# Patient Record
(demographics unavailable — no encounter records)

---

## 2024-10-22 NOTE — HISTORY OF PRESENT ILLNESS
[FreeTextEntry1] : KYRIE [de-identified] : colonoscopy due in 3 years per patient.  Chol went up from prior he states that he needs med refills today

## 2024-10-22 NOTE — HISTORY OF PRESENT ILLNESS
[FreeTextEntry1] : KYRIE [de-identified] : colonoscopy due in 3 years per patient.  Chol went up from prior he states that he needs med refills today

## 2024-10-22 NOTE — ASSESSMENT
[FreeTextEntry1] : , , Preventative: Counseled on health promotion and disease prevention. EKG and wellness labs done colonoscopy due in 3 years Tdap april 205 zoster june 2023 encouraged Flu / Prevnar Heart Screen:  EKG nsr  HTN: Irbesartan - HCTZ Rx -Advised low salt diet, regular exercise, weight loss. -Counseled on importance of medication compliance. -Advised to keep BP log at home- will review at next visit. -Follow up in office for BP check.    HLD: LDL 85--->166 worsened  discussed role for statin if levels are not improving with diet and exercise.  Counseled on diet, exercise and lifestyle modifications. Advised a diet centered around whole plant based foods.  Vegetables, fruits, legumes, grains, nuts.  Advised limiting intake of refined processed foods (refined white flour products, refined sugar, soda, juice).  Limit intake of meat, dairy, eggs, oil. reviewed prior Lipi panel results today    Anxiety: Counseling / therapy increase exercise Alprazolam prn  Insomnia:   Zolpidem rx Sleep hygiene measures    GERD: Counseled on dietary modifications. Avoid tomato products, mint, citrus (drinks / fruit), fried fatty foods, caffeine, chocolate. Avoid food / drink ,2 hours prior to bedtime / napping -Rx Omeprazole -Elena / Rolaids PRN -Gastro eval / H Pylori testing  if not improving   Erectile dysfunction: Sildenafil   prostate CA: repeat PSA normal s/p prostatectomy, on radiation therapy F/U urology & Rad Oncology  reviewed prior records from Urology and radonc today

## 2024-12-02 NOTE — ASSESSMENT
[FreeTextEntry1] : Patient presented today with complaint of lump in the abdominal wall since Thursday after lifting a heavy box of water bottles. He denied any pain no nausea vomiting or constipation. Patient stated the swelling is getting smaller. On examination it is a hernia. Advised patient to lose weight and do core muscle strengthening exercise If the swelling gets bigger or he experiences any pain he will make a follow-up appointment.  Hypertension   Diastolic blood pressure is elevated today. Patient is attributing that to whitecoat syndrome. He will continue with irbesartan-hydrochlorothiazide 300-12.5 mg Advise low-salt diet and exercise. He will follow-up with Dr. Mcdermott in January.

## 2024-12-02 NOTE — REVIEW OF SYSTEMS
[Negative] : Constitutional [Abdominal Pain] : no abdominal pain [Nausea] : no nausea [Vomiting] : no vomiting [FreeTextEntry7] : as hpi

## 2024-12-02 NOTE — PHYSICAL EXAM
[Normal] : no acute distress, well nourished, well developed and well-appearing [de-identified] : +small bulge in the right upper abdomen, non reducible, no pain.

## 2024-12-02 NOTE — HISTORY OF PRESENT ILLNESS
[FreeTextEntry8] : Mr. LIONEL ELMORE is a 66 year male He is pt. of Dr Mcdermott. Patient stated in the over the weekend he did lifting of  boxes of the water bottles, he then noticed a bulge in the upper abdomen , he didn't had any pain , no nausea/vomiting He stated the swelling have gone down a lot. it made him concerned to make this appt.

## 2025-01-24 NOTE — ASSESSMENT
[FreeTextEntry1] : , , HTN: Irbesartan - HCTZ Rx -Advised low salt diet, regular exercise, weight loss. -Counseled on importance of medication compliance. -Advised to keep BP log at home- will review at next visit. -Follow up in office for BP check.    HLD: LDL 85--->166  discussed role for statin if levels are not improving with diet and exercise.  Counseled on diet, exercise and lifestyle modifications. Advised a diet centered around whole plant based foods.  Vegetables, fruits, legumes, grains, nuts.  Advised limiting intake of refined processed foods (refined white flour products, refined sugar, soda, juice).  Limit intake of meat, dairy, eggs, oil. reviewed prior Lipi panel results today    Anxiety: Counseling / therapy increase exercise Alprazolam prn  Insomnia:   Zolpidem rx Sleep hygiene measures    GERD: Counseled on dietary modifications. Avoid tomato products, mint, citrus (drinks / fruit), fried fatty foods, caffeine, chocolate. Avoid food / drink ,2 hours prior to bedtime / napping -Rx Omeprazole -Elena / Rolaids PRN -Gastro eval / H Pylori testing  if not improving   Erectile dysfunction: Sildenafil   prostate CA: repeat PSA normal s/p prostatectomy, on radiation therapy F/U urology & Rad Oncology  reviewed prior records from Urology and radonc today

## 2025-07-18 NOTE — ASSESSMENT
[FreeTextEntry1] : , , Labs ordered he will have done prior to coming in for his CPE in October  HTN: Irbesartan - HCTZ Rx -Advised low salt diet, regular exercise, weight loss. -Counseled on importance of medication compliance. -Advised to keep BP log at home- will review at next visit. -Follow up in office for BP check.    HLD: LDL 85--->166 -->184 discussed role for statin if levels are not improving with diet and exercise.  Counseled on diet, exercise and lifestyle modifications. Advised a diet centered around whole plant based foods.  Vegetables, fruits, legumes, grains, nuts.  Advised limiting intake of refined processed foods (refined white flour products, refined sugar, soda, juice).  Limit intake of meat, dairy, eggs, oil. reviewed prior Lipi panel results today    Anxiety: Counseling / therapy increase exercise Alprazolam prn  Insomnia:   Zolpidem rx Sleep hygiene measures    GERD: Counseled on dietary modifications. Avoid tomato products, mint, citrus (drinks / fruit), fried fatty foods, caffeine, chocolate. Avoid food / drink ,2 hours prior to bedtime / napping -Rx Omeprazole -Elena / Rolaids PRN -Gastro eval / H Pylori testing  if not improving   Erectile dysfunction: Sildenafil   prostate CA: s/p prostatectomy, on radiation therapy F/U urology & Rad Oncology